# Patient Record
Sex: MALE | Race: WHITE
[De-identification: names, ages, dates, MRNs, and addresses within clinical notes are randomized per-mention and may not be internally consistent; named-entity substitution may affect disease eponyms.]

---

## 2019-11-21 ENCOUNTER — HOSPITAL ENCOUNTER (EMERGENCY)
Dept: HOSPITAL 62 - ER | Age: 59
Discharge: HOME | End: 2019-11-21
Payer: COMMERCIAL

## 2019-11-21 VITALS — SYSTOLIC BLOOD PRESSURE: 180 MMHG | DIASTOLIC BLOOD PRESSURE: 106 MMHG

## 2019-11-21 DIAGNOSIS — M54.5: ICD-10-CM

## 2019-11-21 DIAGNOSIS — R20.0: ICD-10-CM

## 2019-11-21 DIAGNOSIS — R20.2: ICD-10-CM

## 2019-11-21 DIAGNOSIS — M62.838: ICD-10-CM

## 2019-11-21 DIAGNOSIS — T14.8XXA: Primary | ICD-10-CM

## 2019-11-21 DIAGNOSIS — Z72.0: ICD-10-CM
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
## 2019-11-21 PROCEDURE — 72110 X-RAY EXAM L-2 SPINE 4/>VWS: CPT

## 2019-11-21 PROCEDURE — 96372 THER/PROPH/DIAG INJ SC/IM: CPT

## 2019-11-21 PROCEDURE — 99283 EMERGENCY DEPT VISIT LOW MDM: CPT

## 2019-11-21 PROCEDURE — 72070 X-RAY EXAM THORAC SPINE 2VWS: CPT
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
